# Patient Record
Sex: MALE | Race: WHITE | NOT HISPANIC OR LATINO | Employment: OTHER | URBAN - NONMETROPOLITAN AREA
[De-identification: names, ages, dates, MRNs, and addresses within clinical notes are randomized per-mention and may not be internally consistent; named-entity substitution may affect disease eponyms.]

---

## 2017-04-24 ENCOUNTER — OFFICE VISIT (OUTPATIENT)
Dept: SURGERY | Facility: CLINIC | Age: 62
End: 2017-04-24

## 2017-04-24 VITALS
DIASTOLIC BLOOD PRESSURE: 70 MMHG | SYSTOLIC BLOOD PRESSURE: 120 MMHG | BODY MASS INDEX: 25.9 KG/M2 | HEIGHT: 71 IN | TEMPERATURE: 98.7 F | WEIGHT: 185 LBS

## 2017-04-24 DIAGNOSIS — Z80.0 FAMILY HISTORY OF COLON CANCER: ICD-10-CM

## 2017-04-24 DIAGNOSIS — R13.14 PHARYNGOESOPHAGEAL DYSPHAGIA: ICD-10-CM

## 2017-04-24 DIAGNOSIS — Z12.11 SCREENING FOR COLON CANCER: ICD-10-CM

## 2017-04-24 DIAGNOSIS — D50.9 IRON DEFICIENCY ANEMIA, UNSPECIFIED IRON DEFICIENCY ANEMIA TYPE: Primary | ICD-10-CM

## 2017-04-24 PROCEDURE — 99244 OFF/OP CNSLTJ NEW/EST MOD 40: CPT | Performed by: SURGERY

## 2017-04-24 RX ORDER — MYCOPHENOLATE MOFETIL 250 MG/1
CAPSULE ORAL 2 TIMES DAILY
COMMUNITY

## 2017-04-24 RX ORDER — MULTIPLE VITAMINS W/ MINERALS TAB 9MG-400MCG
1 TAB ORAL DAILY
COMMUNITY

## 2017-04-24 RX ORDER — CYCLOSPORINE 25 MG/1
75 CAPSULE, LIQUID FILLED ORAL 2 TIMES DAILY
COMMUNITY

## 2017-04-24 RX ORDER — CLONIDINE HYDROCHLORIDE 0.2 MG/1
0.2 TABLET ORAL 2 TIMES DAILY
COMMUNITY

## 2017-04-24 RX ORDER — ASPIRIN 81 MG/1
81 TABLET ORAL DAILY
COMMUNITY

## 2017-04-24 RX ORDER — PRAVASTATIN SODIUM 20 MG
20 TABLET ORAL DAILY
COMMUNITY

## 2017-04-24 RX ORDER — DILTIAZEM HYDROCHLORIDE 180 MG/1
180 CAPSULE, COATED, EXTENDED RELEASE ORAL DAILY
COMMUNITY

## 2017-04-24 RX ORDER — SODIUM CHLORIDE 9 MG/ML
30 INJECTION, SOLUTION INTRAVENOUS CONTINUOUS PRN
Status: CANCELLED | OUTPATIENT
Start: 2017-04-24

## 2017-04-24 NOTE — PROGRESS NOTES
Primary Care Provider: Breanne Weaver MD    Reason for Consultation: Colonoscopy    Chief complaint: I'm here for a colonoscopy  Chief Complaint   Patient presents with   • Colon Polyps     Prior exam more than 3 yrs ago. Patient has posititve family hx of colon cancer. Patient denies rectal bleeding and constipation.        Subjective .     History of present illness:  I saw the patient in the office today as a consultation for evaluation for colonoscopy.  He has strong family history of colon cancer in his brother.  He has no black or bloody bowel movements.  No abdominal pain.  He also complains of intermittent dysphagia to solid foods.  No chest pain however.  No nausea or vomiting.  He also states that he is anemic.  Again no bleeding from his gastrointestinal system or genitourinary bleeding.    Review of Systems   Constitutional: Negative for chills, fever and unexpected weight change.   HENT: Negative for trouble swallowing and voice change.    Eyes: Negative for visual disturbance.   Respiratory: Negative for apnea, cough, chest tightness, shortness of breath and wheezing.    Cardiovascular: Negative for chest pain, palpitations and leg swelling.   Gastrointestinal: Negative for abdominal distention, abdominal pain, anal bleeding, blood in stool, constipation, diarrhea, nausea, rectal pain and vomiting.   Endocrine: Negative for cold intolerance and heat intolerance.   Genitourinary: Negative for difficulty urinating, dysuria, flank pain, scrotal swelling and testicular pain.   Musculoskeletal: Negative for back pain, gait problem and joint swelling.   Skin: Negative for color change, rash and wound.   Neurological: Negative for dizziness, syncope, speech difficulty, weakness, numbness and headaches.   Hematological: Negative for adenopathy. Does not bruise/bleed easily.   Psychiatric/Behavioral: Negative for confusion. The patient is not nervous/anxious.        History:  Past Medical History:   Diagnosis  Date   • Chronic kidney disease    • Diabetes mellitus    • DVT (deep venous thrombosis)    • Gout    • Hypertension    • Skin cancer        Past Surgical History:   Procedure Laterality Date   • CHOLECYSTECTOMY     • COLONOSCOPY     • TRANSPLANTATION RENAL         Family History   Problem Relation Age of Onset   • Cancer Father      bone   • Cancer Son      brother   • Hypertension Son        Social History   Substance Use Topics   • Smoking status: Former Smoker   • Smokeless tobacco: None   • Alcohol use No       Allergies:  No Known Allergies    Medications:    Current Outpatient Prescriptions:   •  aspirin 81 MG EC tablet, Take 81 mg by mouth Daily., Disp: , Rfl:   •  CloNIDine (CATAPRES) 0.2 MG tablet, Take 0.2 mg by mouth 2 (Two) Times a Day., Disp: , Rfl:   •  cycloSPORINE modified (NEORAL) 25 MG capsule, Take 25 mg by mouth 2 (Two) Times a Day., Disp: , Rfl:   •  diltiaZEM CD (CARDIZEM CD) 180 MG 24 hr capsule, Take 180 mg by mouth Daily., Disp: , Rfl:   •  Multiple Vitamins-Minerals (MULTIVITAMIN WITH MINERALS) tablet tablet, Take 1 tablet by mouth Daily., Disp: , Rfl:   •  mycophenolate (CELLCEPT) 250 MG capsule, Take  by mouth 2 (Two) Times a Day., Disp: , Rfl:   •  pravastatin (PRAVACHOL) 20 MG tablet, Take 20 mg by mouth Daily., Disp: , Rfl:     Objective     Vital Signs:   Temp:  [98.7 °F (37.1 °C)] 98.7 °F (37.1 °C)  BP: (120)/(70) 120/70    Physical Exam:   General Appearance:    Alert, cooperative, in no acute distress   Head:    Normocephalic, without obvious abnormality, atraumatic   Eyes:            Lids and lashes normal, conjunctivae and sclerae normal, no   icterus, no pallor, corneas clear, PERRL   Ears:    Ears appear intact with no abnormalities noted   Throat:   No oral lesions, no thrush, oral mucosa moist   Lungs:     Clear to auscultation,respirations regular, even and                  unlabored    Heart:    Regular rhythm and normal rate, normal S1 and S2, no            murmur    Abdomen:     no masses, no organomegaly, soft non-tender, non-distended, no guarding, there is evidence of right upper quadrant tenderness   Extremities:   Moves all extremities well, no edema, no cyanosis, no             redness   Skin:   No bleeding, bruising or rash   Neurologic:   Cranial nerves 2 - 12 grossly intact, sensation intact   Psychiatric: No evidence of depression or anxiety      Results Review:   I reviewed the patient's new clinical results. laboratory studies were reviewed    Assessment/Plan :    1. Iron deficiency anemia, unspecified iron deficiency anemia type    2. Pharyngoesophageal dysphagia    3. Screening for colon cancer    4. Family history of colon cancer        I recommend a colonoscopy for further evaluation. The procedure was explained as well as the risks which include but are not limited to bleeding, infection, perforation, abdominal pain etc. The patient understands these risks and the procedure and wishes to proceed.   I also recommended an EGD to further evaluate for his dysphagia and iron deficiency anemia.  He understands the additional risks of bleeding as well as esophageal injury specifically relating to possible esophageal dilatation.  He wishes to proceed.    El Poole MD  04/24/17

## 2017-04-28 ENCOUNTER — TELEPHONE (OUTPATIENT)
Dept: SURGERY | Facility: CLINIC | Age: 62
End: 2017-04-28

## 2017-04-28 NOTE — TELEPHONE ENCOUNTER
Pt requested his colonoscopy/egd to be rescheduled to June 30 at [facility]. Abby Bravo aware. Thanks.

## 2017-06-30 ENCOUNTER — HOSPITAL ENCOUNTER (OUTPATIENT)
Facility: HOSPITAL | Age: 62
Setting detail: HOSPITAL OUTPATIENT SURGERY
Discharge: HOME | End: 2017-06-30
Attending: SURGERY | Admitting: SURGERY

## 2017-06-30 ENCOUNTER — ANESTHESIA EVENT (OUTPATIENT)
Dept: GASTROENTEROLOGY | Facility: HOSPITAL | Age: 62
End: 2017-06-30

## 2017-06-30 ENCOUNTER — ANESTHESIA (OUTPATIENT)
Dept: GASTROENTEROLOGY | Facility: HOSPITAL | Age: 62
End: 2017-06-30

## 2017-06-30 VITALS
TEMPERATURE: 98.1 F | OXYGEN SATURATION: 95 % | DIASTOLIC BLOOD PRESSURE: 66 MMHG | HEART RATE: 56 BPM | RESPIRATION RATE: 18 BRPM | SYSTOLIC BLOOD PRESSURE: 108 MMHG | WEIGHT: 181 LBS | BODY MASS INDEX: 25.34 KG/M2 | HEIGHT: 71 IN

## 2017-06-30 DIAGNOSIS — R13.14 PHARYNGOESOPHAGEAL DYSPHAGIA: ICD-10-CM

## 2017-06-30 DIAGNOSIS — Z12.11 SCREENING FOR COLON CANCER: ICD-10-CM

## 2017-06-30 DIAGNOSIS — D50.9 IRON DEFICIENCY ANEMIA, UNSPECIFIED IRON DEFICIENCY ANEMIA TYPE: ICD-10-CM

## 2017-06-30 DIAGNOSIS — Z80.0 FAMILY HISTORY OF COLON CANCER: ICD-10-CM

## 2017-06-30 PROCEDURE — 25010000002 PROPOFOL 200 MG/20ML EMULSION: Performed by: NURSE ANESTHETIST, CERTIFIED REGISTERED

## 2017-06-30 PROCEDURE — C1726 CATH, BAL DIL, NON-VASCULAR: HCPCS | Performed by: SURGERY

## 2017-06-30 PROCEDURE — S0260 H&P FOR SURGERY: HCPCS | Performed by: SURGERY

## 2017-06-30 RX ORDER — ONDANSETRON 2 MG/ML
4 INJECTION INTRAMUSCULAR; INTRAVENOUS ONCE AS NEEDED
Status: DISCONTINUED | OUTPATIENT
Start: 2017-06-30 | End: 2017-06-30 | Stop reason: HOSPADM

## 2017-06-30 RX ORDER — PROPOFOL 10 MG/ML
INJECTION, EMULSION INTRAVENOUS AS NEEDED
Status: DISCONTINUED | OUTPATIENT
Start: 2017-06-30 | End: 2017-06-30 | Stop reason: SURG

## 2017-06-30 RX ORDER — SODIUM CHLORIDE 9 MG/ML
INJECTION, SOLUTION INTRAVENOUS CONTINUOUS PRN
Status: DISCONTINUED | OUTPATIENT
Start: 2017-06-30 | End: 2017-06-30 | Stop reason: SURG

## 2017-06-30 RX ORDER — SODIUM CHLORIDE 9 MG/ML
30 INJECTION, SOLUTION INTRAVENOUS CONTINUOUS PRN
Status: DISCONTINUED | OUTPATIENT
Start: 2017-06-30 | End: 2017-06-30 | Stop reason: HOSPADM

## 2017-06-30 RX ADMIN — SODIUM CHLORIDE: 9 INJECTION, SOLUTION INTRAVENOUS at 12:53

## 2017-06-30 RX ADMIN — SODIUM CHLORIDE 30 ML/HR: 9 INJECTION, SOLUTION INTRAVENOUS at 10:19

## 2017-06-30 RX ADMIN — PROPOFOL 500 MG: 10 INJECTION, EMULSION INTRAVENOUS at 12:53

## 2017-06-30 RX ADMIN — Medication 50 MG: at 12:53

## 2017-06-30 RX ADMIN — LIDOCAINE HYDROCHLORIDE 100 MG: 20 INJECTION, SOLUTION INTRAVENOUS at 12:53

## 2017-06-30 NOTE — ANESTHESIA PREPROCEDURE EVALUATION
Anesthesia Evaluation     Patient summary reviewed and Nursing notes reviewed   no history of anesthetic complications:  NPO Solid Status: > 8 hours  NPO Liquid Status: > 8 hours     Airway   Mallampati: I  TM distance: >3 FB  Neck ROM: full  no difficulty expected  Dental - normal exam     Pulmonary - normal exam   (+) a smoker Former,   Cardiovascular - normal exam    (+) hypertension, DVT,       Neuro/Psych- negative ROS  GI/Hepatic/Renal/Endo    (+)  renal disease CRI,     Musculoskeletal     Abdominal    Substance History - negative use     OB/GYN negative ob/gyn ROS         Other   (+) arthritis                                     Anesthesia Plan    ASA 3     MAC   (Risks and benefits discussed including risk of aspiration, recall and dental damage. All patient questions answered. Will continue with POC.)  intravenous induction   Anesthetic plan and risks discussed with patient.

## 2017-06-30 NOTE — ANESTHESIA POSTPROCEDURE EVALUATION
Patient: Jose Luis Bland    Procedure Summary     Date Anesthesia Start Anesthesia Stop Room / Location    06/30/17 [id number]       Procedure Diagnosis Surgeon Provider    COLONOSCOPY WITH COLD SNARE POLYPECTOMY X 2 (N/A Anus); ESOPHAGOGASTRODUODENOSCOPY WITH BIOPSIES; ESOPHAGEAL BALLOON DILATATION 18MM (N/A Esophagus) Family history of colon cancer; Pharyngoesophageal dysphagia; Screening for colon cancer; Iron deficiency anemia, unspecified iron deficiency anemia type  (Family history of colon cancer [Z80.0]; Pharyngoesophageal dysphagia [R13.14]; Screening for colon cancer [Z12.11]; Iron deficiency anemia, unspecified iron deficiency anemia type [D50.9]) MD Jose Santana CRNA          Anesthesia Type: MAC  Last vitals  BP      Temp      Pulse     Resp      SpO2        Post Anesthesia Care and Evaluation    Patient location during evaluation: PACU  Patient participation: complete - patient participated  Level of consciousness: awake and alert  Pain management: adequate  Airway patency: patent  Anesthetic complications: No anesthetic complications  PONV Status: none  Cardiovascular status: acceptable  Respiratory status: acceptable  Hydration status: acceptable

## 2017-07-07 LAB
LAB AP CASE REPORT: NORMAL
Lab: NORMAL
PATH REPORT.FINAL DX SPEC: NORMAL

## 2017-07-14 ENCOUNTER — OFFICE VISIT (OUTPATIENT)
Dept: SURGERY | Facility: CLINIC | Age: 62
End: 2017-07-14

## 2017-07-14 VITALS — HEART RATE: 64 BPM | TEMPERATURE: 98.4 F

## 2017-07-14 DIAGNOSIS — D50.9 IRON DEFICIENCY ANEMIA, UNSPECIFIED IRON DEFICIENCY ANEMIA TYPE: ICD-10-CM

## 2017-07-14 DIAGNOSIS — K57.30 DIVERTICULOSIS OF LARGE INTESTINE WITHOUT HEMORRHAGE: ICD-10-CM

## 2017-07-14 DIAGNOSIS — R13.14 PHARYNGOESOPHAGEAL DYSPHAGIA: Primary | ICD-10-CM

## 2017-07-14 PROCEDURE — 99213 OFFICE O/P EST LOW 20 MIN: CPT | Performed by: SURGERY

## 2017-07-14 NOTE — PROGRESS NOTES
Patient: Jose Luis Bland    YOB: 1955    Date: 07/14/2017    Primary Care Provider: Breanne Weaver MD    Reason for Consultation: Follow-up EGD/COLONOSCOPY    Chief Complaint:   Chief Complaint   Patient presents with   • Follow-up     egd/colonoscopy.       History of present illness:  I saw the patient in the office today as a followup from their recent EGD and colonoscopy.  Patient states that he is not having any issues.  Swallowing without difficulty and has had no further dysphagia.  He's had no black or bloody bowel movements.  No abdominal Pain    Review of Systems   Constitutional: Negative for chills, fever and unexpected weight change.   HENT: Negative for trouble swallowing and voice change.    Eyes: Negative for visual disturbance.   Respiratory: Negative for apnea, cough, chest tightness, shortness of breath and wheezing.    Cardiovascular: Negative for chest pain, palpitations and leg swelling.   Gastrointestinal: Negative for abdominal distention, abdominal pain, anal bleeding, blood in stool, constipation, diarrhea, nausea, rectal pain and vomiting.   Endocrine: Negative for cold intolerance and heat intolerance.   Genitourinary: Negative for difficulty urinating, dysuria, flank pain, scrotal swelling and testicular pain.   Musculoskeletal: Negative for back pain, gait problem and joint swelling.   Skin: Negative for color change, rash and wound.   Neurological: Negative for dizziness, syncope, speech difficulty, weakness, numbness and headaches.   Hematological: Negative for adenopathy. Does not bruise/bleed easily.   Psychiatric/Behavioral: Negative for confusion. The patient is not nervous/anxious.        Vital Signs:   Temp:  [98.4 °F (36.9 °C)] 98.4 °F (36.9 °C)  Heart Rate:  [64] 64    Allergies:  No Known Allergies    Medications:    Current Outpatient Prescriptions:   •  aspirin 81 MG EC tablet, Take 81 mg by mouth Daily., Disp: , Rfl:   •  CloNIDine (CATAPRES) 0.2 MG tablet,  Take 0.2 mg by mouth 2 (Two) Times a Day., Disp: , Rfl:   •  cycloSPORINE modified (NEORAL) 25 MG capsule, Take 75 mg by mouth 2 (Two) Times a Day. PATIENT REPORTS TAKES 3 OF THE 25MG TABLETS IN THE A.M. AND AGAIN IN THE P.M., Disp: , Rfl:   •  diltiaZEM CD (CARDIZEM CD) 180 MG 24 hr capsule, Take 180 mg by mouth Daily., Disp: , Rfl:   •  Multiple Vitamins-Minerals (MULTIVITAMIN WITH MINERALS) tablet tablet, Take 1 tablet by mouth Daily., Disp: , Rfl:   •  mycophenolate (CELLCEPT) 250 MG capsule, Take  by mouth 2 (Two) Times a Day., Disp: , Rfl:   •  pravastatin (PRAVACHOL) 20 MG tablet, Take 20 mg by mouth Daily., Disp: , Rfl:     Physical Exam:   General Appearance:    Alert, cooperative, in no acute distress   Abdomen:     no masses, no organomegaly, soft non-tender, non-distended, no guarding, wounds are well healed, no evidence of recurrent hernia            Cor:  Regular rate and rhythm      Assessment / Plan:    1. Pharyngoesophageal dysphagia :no issues at this time.  No evidence of eosinophilic esophagitis.  He is status post dilatation. follow-up as needed   2. Diverticulosis of large intestine without hemorrhage:fiber supplement of choice   3. Iron deficiency anemia, unspecified iron deficiency anemia type :improving.  No evidence of GI bleed(STOOL GUAIAC WAS NORMAL) and upper and lower endoscopy failed to reveal a bleeding source.  No further workup required unless he shows signs of GI bleeding.       I do recommend a repeat colonoscopy in 5 years for his adenomatous colon polyp.    Electronically signed by El Poole MD  07/14/17

## 2018-05-21 ENCOUNTER — OFFICE VISIT (OUTPATIENT)
Dept: SURGERY | Facility: CLINIC | Age: 63
End: 2018-05-21

## 2018-05-21 VITALS
SYSTOLIC BLOOD PRESSURE: 122 MMHG | HEART RATE: 60 BPM | DIASTOLIC BLOOD PRESSURE: 82 MMHG | OXYGEN SATURATION: 99 % | WEIGHT: 184 LBS | BODY MASS INDEX: 25.76 KG/M2 | HEIGHT: 71 IN | TEMPERATURE: 98.9 F

## 2018-05-21 DIAGNOSIS — R13.19 ESOPHAGEAL DYSPHAGIA: Primary | ICD-10-CM

## 2018-05-21 PROCEDURE — 99214 OFFICE O/P EST MOD 30 MIN: CPT | Performed by: SURGERY

## 2018-05-21 NOTE — PROGRESS NOTES
Patient: Jose Luis Bland    YOB: 1955    Date: 05/21/2018    Primary Care Provider: Breanne Weaver MD    Reason for Consultation: EGD    Chief Complaint   Patient presents with   • Difficulty Swallowing       Subjective .     History of present illness:  I saw the patient in the office today as a consultation for evaluation and treatment of dysphagia. Patient states that since yesterday's been having trouble swallowing solids.  Had to vomit up a bolus of food since it would not pass.  Finally today he was able to swallow liquids without difficulty.  He has no associated abdominal pain.  No lower GI complaints.  He had an EGD about a year ago with dilation which improved his symptoms.  He takes Zantac as needed for occasional reflux symptoms.    The following portions of the patient's history were reviewed and updated as appropriate: allergies, current medications, past family history, past medical history, past social history, past surgical history and problem list.      Review of Systems   Constitutional: Negative for chills, fever and unexpected weight change.   HENT: Positive for trouble swallowing. Negative for voice change.    Eyes: Negative for visual disturbance.   Respiratory: Negative for apnea, cough, chest tightness, shortness of breath and wheezing.    Cardiovascular: Negative for chest pain, palpitations and leg swelling.   Gastrointestinal: Negative for abdominal distention, abdominal pain, anal bleeding, blood in stool, constipation, diarrhea, nausea, rectal pain and vomiting.   Endocrine: Negative for cold intolerance and heat intolerance.   Genitourinary: Negative for difficulty urinating, dysuria, flank pain, scrotal swelling and testicular pain.   Musculoskeletal: Negative for back pain, gait problem and joint swelling.   Skin: Negative for color change, rash and wound.   Neurological: Negative for dizziness, syncope, speech difficulty, weakness, numbness and headaches.    Hematological: Negative for adenopathy. Does not bruise/bleed easily.   Psychiatric/Behavioral: Negative for confusion. The patient is not nervous/anxious.        History:  Past Medical History:   Diagnosis Date   • Arthritis     IN KNEES   • Chronic kidney disease    • DVT (deep venous thrombosis)     REPORTS SEVERAL YEARS AGO   • Dysphagia    • Gout    • History of cytomegalovirus infection 2001   • Hypertension    • Skin cancer    • Wears glasses        Past Surgical History:   Procedure Laterality Date   • CHOLECYSTECTOMY     • COLONOSCOPY     • COLONOSCOPY N/A 6/30/2017    Procedure: COLONOSCOPY WITH COLD SNARE POLYPECTOMY X 2;  Surgeon: El Poole MD;  Location: [facility] ENDOSCOPY;  Service:    • ENDOSCOPY N/A 6/30/2017    Procedure: ESOPHAGOGASTRODUODENOSCOPY WITH BIOPSIES; ESOPHAGEAL BALLOON DILATATION 18MM;  Surgeon: El Poole MD;  Location: [facility] ENDOSCOPY;  Service:    • ERCP W/ METAL STENT PLACEMENT      stent removed in Sept 2016   • OTHER SURGICAL HISTORY      LEFT ARM AFTER FRACTURE, REPORTS HARDWARE WAS PLACED   • SKIN BIOPSY     • TRANSPLANTATION RENAL         Family History   Problem Relation Age of Onset   • Cancer Father         bone   • Cancer Son         brother   • Hypertension Son        Social History   Substance Use Topics   • Smoking status: Former Smoker     Types: Cigarettes   • Smokeless tobacco: Never Used      Comment: QUIT 40 YEARS AGO, REPORTS SMOKED FOR 6-7 YEARS UP TO 1 PPD   • Alcohol use No       Medications:    Current Outpatient Prescriptions:   •  aspirin 81 MG EC tablet, Take 81 mg by mouth Daily., Disp: , Rfl:   •  CloNIDine (CATAPRES) 0.2 MG tablet, Take 0.2 mg by mouth 2 (Two) Times a Day., Disp: , Rfl:   •  cycloSPORINE modified (NEORAL) 25 MG capsule, Take 75 mg by mouth 2 (Two) Times a Day. PATIENT REPORTS TAKES 3 OF THE 25MG TABLETS IN THE A.M. AND AGAIN IN THE P.M., Disp: , Rfl:   •  diltiaZEM CD (CARDIZEM CD) 180 MG 24 hr capsule, Take 180 mg by  "mouth Daily., Disp: , Rfl:   •  Multiple Vitamins-Minerals (MULTIVITAMIN WITH MINERALS) tablet tablet, Take 1 tablet by mouth Daily., Disp: , Rfl:   •  mycophenolate (CELLCEPT) 250 MG capsule, Take  by mouth 2 (Two) Times a Day., Disp: , Rfl:   •  pravastatin (PRAVACHOL) 20 MG tablet, Take 20 mg by mouth Daily., Disp: , Rfl:      Allergies:  No Known Allergies    Objective     Vital Signs:   Vitals:    05/21/18 1327   BP: 122/82   Pulse: 60   Temp: 98.9 °F (37.2 °C)   SpO2: 99%   Weight: 83.5 kg (184 lb)   Height: 180.3 cm (70.98\")       Physical Exam:   General Appearance:    Alert, cooperative, in no acute distress   Head:    Normocephalic, without obvious abnormality, atraumatic   Eyes:            Lids and lashes normal, conjunctivae and sclerae normal, no   icterus, no pallor, corneas clear, PERRL   Lungs/respiratory:     Clear to auscultation without rhonchi or crackles     Heart/cardiovascular:    Regular rate and rhythm without obvious murmur.     Abdomen:     Soft nontender and nondistended without hepatosplenomegaly.     Extremities:   Moves all extremities well, no edema, no cyanosis, no             redness   Skin:   No bleeding, bruising or rash   Neurologic:   Cranial nerves 2 - 12 grossly intact, sensation intact   Psychiatric: No evidence of depression or anxiety      Results Review:   I reviewed the patient's new clinical results.    Review of Systems was reviewed and confirmed as accurate today.    Assessment/Plan :    1. Esophageal dysphagia        I recommend an upper endoscopy with possible esophageal dilation.  He understands this procedure as well as the risks of bleeding and perforation and he understands the ramifications of these potential complications and he wishes to proceed.  Patient may require Zantac on a daily basis given that he had some esophagitis findings on biopsy from the upper endoscopy although no visible esophagitis.    Electronically signed by El Poole, " MD  05/21/18  1:39 PM    Scribed for El Poole MD by Janina Blake. 5/21/2018  2:26 PM

## 2018-05-22 ENCOUNTER — ANESTHESIA (OUTPATIENT)
Dept: GASTROENTEROLOGY | Facility: HOSPITAL | Age: 63
End: 2018-05-22

## 2018-05-22 ENCOUNTER — ANESTHESIA EVENT (OUTPATIENT)
Dept: GASTROENTEROLOGY | Facility: HOSPITAL | Age: 63
End: 2018-05-22

## 2018-05-22 ENCOUNTER — HOSPITAL ENCOUNTER (OUTPATIENT)
Facility: HOSPITAL | Age: 63
Setting detail: HOSPITAL OUTPATIENT SURGERY
Discharge: HOME | End: 2018-05-22
Attending: SURGERY | Admitting: SURGERY

## 2018-05-22 VITALS
DIASTOLIC BLOOD PRESSURE: 68 MMHG | WEIGHT: 184 LBS | HEART RATE: 43 BPM | SYSTOLIC BLOOD PRESSURE: 111 MMHG | BODY MASS INDEX: 25.76 KG/M2 | OXYGEN SATURATION: 99 % | TEMPERATURE: 98.1 F | HEIGHT: 71 IN | RESPIRATION RATE: 18 BRPM

## 2018-05-22 DIAGNOSIS — R13.19 ESOPHAGEAL DYSPHAGIA: ICD-10-CM

## 2018-05-22 PROCEDURE — 25010000002 PROPOFOL 10 MG/ML EMULSION: Performed by: NURSE ANESTHETIST, CERTIFIED REGISTERED

## 2018-05-22 PROCEDURE — 25010000002 METOCLOPRAMIDE PER 10 MG: Performed by: NURSE ANESTHETIST, CERTIFIED REGISTERED

## 2018-05-22 PROCEDURE — C1726 CATH, BAL DIL, NON-VASCULAR: HCPCS | Performed by: SURGERY

## 2018-05-22 RX ORDER — LIDOCAINE HYDROCHLORIDE 20 MG/ML
INJECTION, SOLUTION EPIDURAL; INFILTRATION; INTRACAUDAL; PERINEURAL AS NEEDED
Status: DISCONTINUED | OUTPATIENT
Start: 2018-05-22 | End: 2018-05-22

## 2018-05-22 RX ORDER — MAGNESIUM HYDROXIDE 1200 MG/15ML
LIQUID ORAL AS NEEDED
Status: DISCONTINUED | OUTPATIENT
Start: 2018-05-22 | End: 2018-05-22 | Stop reason: HOSPADM

## 2018-05-22 RX ORDER — SODIUM CHLORIDE, SODIUM LACTATE, POTASSIUM CHLORIDE, CALCIUM CHLORIDE 600; 310; 30; 20 MG/100ML; MG/100ML; MG/100ML; MG/100ML
1000 INJECTION, SOLUTION INTRAVENOUS CONTINUOUS
Status: DISCONTINUED | OUTPATIENT
Start: 2018-05-22 | End: 2018-05-22

## 2018-05-22 RX ORDER — ONDANSETRON 2 MG/ML
4 INJECTION INTRAMUSCULAR; INTRAVENOUS ONCE AS NEEDED
Status: DISCONTINUED | OUTPATIENT
Start: 2018-05-22 | End: 2018-05-22 | Stop reason: HOSPADM

## 2018-05-22 RX ORDER — PROPOFOL 10 MG/ML
VIAL (ML) INTRAVENOUS AS NEEDED
Status: DISCONTINUED | OUTPATIENT
Start: 2018-05-22 | End: 2018-05-22 | Stop reason: SURG

## 2018-05-22 RX ORDER — ONDANSETRON 2 MG/ML
4 INJECTION INTRAMUSCULAR; INTRAVENOUS ONCE AS NEEDED
Status: CANCELLED | OUTPATIENT
Start: 2018-05-22 | End: 2018-05-22

## 2018-05-22 RX ORDER — LIDOCAINE HYDROCHLORIDE 20 MG/ML
INJECTION, SOLUTION EPIDURAL; INFILTRATION; INTRACAUDAL; PERINEURAL AS NEEDED
Status: DISCONTINUED | OUTPATIENT
Start: 2018-05-22 | End: 2018-05-22 | Stop reason: SURG

## 2018-05-22 RX ORDER — SODIUM CHLORIDE 9 MG/ML
500 INJECTION, SOLUTION INTRAVENOUS CONTINUOUS
Status: DISCONTINUED | OUTPATIENT
Start: 2018-05-22 | End: 2018-05-22 | Stop reason: HOSPADM

## 2018-05-22 RX ORDER — METOCLOPRAMIDE HYDROCHLORIDE 5 MG/ML
INJECTION INTRAMUSCULAR; INTRAVENOUS AS NEEDED
Status: DISCONTINUED | OUTPATIENT
Start: 2018-05-22 | End: 2018-05-22 | Stop reason: SURG

## 2018-05-22 RX ADMIN — LIDOCAINE HYDROCHLORIDE 100 MG: 20 INJECTION, SOLUTION EPIDURAL; INFILTRATION; INTRACAUDAL; PERINEURAL at 13:31

## 2018-05-22 RX ADMIN — SODIUM CHLORIDE 500 ML: 9 INJECTION, SOLUTION INTRAVENOUS at 12:29

## 2018-05-22 RX ADMIN — METOCLOPRAMIDE 10 MG: 5 INJECTION, SOLUTION INTRAMUSCULAR; INTRAVENOUS at 13:38

## 2018-05-22 RX ADMIN — PROPOFOL 200 MG: 10 INJECTION, EMULSION INTRAVENOUS at 13:50

## 2018-05-22 NOTE — H&P (VIEW-ONLY)
Patient: Jose Luis Bland    YOB: 1955    Date: 05/21/2018    Primary Care Provider: Breanne Weaver MD    Reason for Consultation: EGD    Chief Complaint   Patient presents with   • Difficulty Swallowing       Subjective .     History of present illness:  I saw the patient in the office today as a consultation for evaluation and treatment of dysphagia. Patient states that since yesterday's been having trouble swallowing solids.  Had to vomit up a bolus of food since it would not pass.  Finally today he was able to swallow liquids without difficulty.  He has no associated abdominal pain.  No lower GI complaints.  He had an EGD about a year ago with dilation which improved his symptoms.  He takes Zantac as needed for occasional reflux symptoms.    The following portions of the patient's history were reviewed and updated as appropriate: allergies, current medications, past family history, past medical history, past social history, past surgical history and problem list.      Review of Systems   Constitutional: Negative for chills, fever and unexpected weight change.   HENT: Positive for trouble swallowing. Negative for voice change.    Eyes: Negative for visual disturbance.   Respiratory: Negative for apnea, cough, chest tightness, shortness of breath and wheezing.    Cardiovascular: Negative for chest pain, palpitations and leg swelling.   Gastrointestinal: Negative for abdominal distention, abdominal pain, anal bleeding, blood in stool, constipation, diarrhea, nausea, rectal pain and vomiting.   Endocrine: Negative for cold intolerance and heat intolerance.   Genitourinary: Negative for difficulty urinating, dysuria, flank pain, scrotal swelling and testicular pain.   Musculoskeletal: Negative for back pain, gait problem and joint swelling.   Skin: Negative for color change, rash and wound.   Neurological: Negative for dizziness, syncope, speech difficulty, weakness, numbness and headaches.    Hematological: Negative for adenopathy. Does not bruise/bleed easily.   Psychiatric/Behavioral: Negative for confusion. The patient is not nervous/anxious.        History:  Past Medical History:   Diagnosis Date   • Arthritis     IN KNEES   • Chronic kidney disease    • DVT (deep venous thrombosis)     REPORTS SEVERAL YEARS AGO   • Dysphagia    • Gout    • History of cytomegalovirus infection 2001   • Hypertension    • Skin cancer    • Wears glasses        Past Surgical History:   Procedure Laterality Date   • CHOLECYSTECTOMY     • COLONOSCOPY     • COLONOSCOPY N/A 6/30/2017    Procedure: COLONOSCOPY WITH COLD SNARE POLYPECTOMY X 2;  Surgeon: El Poole MD;  Location: [facility] ENDOSCOPY;  Service:    • ENDOSCOPY N/A 6/30/2017    Procedure: ESOPHAGOGASTRODUODENOSCOPY WITH BIOPSIES; ESOPHAGEAL BALLOON DILATATION 18MM;  Surgeon: El Poole MD;  Location: [facility] ENDOSCOPY;  Service:    • ERCP W/ METAL STENT PLACEMENT      stent removed in Sept 2016   • OTHER SURGICAL HISTORY      LEFT ARM AFTER FRACTURE, REPORTS HARDWARE WAS PLACED   • SKIN BIOPSY     • TRANSPLANTATION RENAL         Family History   Problem Relation Age of Onset   • Cancer Father         bone   • Cancer Son         brother   • Hypertension Son        Social History   Substance Use Topics   • Smoking status: Former Smoker     Types: Cigarettes   • Smokeless tobacco: Never Used      Comment: QUIT 40 YEARS AGO, REPORTS SMOKED FOR 6-7 YEARS UP TO 1 PPD   • Alcohol use No       Medications:    Current Outpatient Prescriptions:   •  aspirin 81 MG EC tablet, Take 81 mg by mouth Daily., Disp: , Rfl:   •  CloNIDine (CATAPRES) 0.2 MG tablet, Take 0.2 mg by mouth 2 (Two) Times a Day., Disp: , Rfl:   •  cycloSPORINE modified (NEORAL) 25 MG capsule, Take 75 mg by mouth 2 (Two) Times a Day. PATIENT REPORTS TAKES 3 OF THE 25MG TABLETS IN THE A.M. AND AGAIN IN THE P.M., Disp: , Rfl:   •  diltiaZEM CD (CARDIZEM CD) 180 MG 24 hr capsule, Take 180 mg by  "mouth Daily., Disp: , Rfl:   •  Multiple Vitamins-Minerals (MULTIVITAMIN WITH MINERALS) tablet tablet, Take 1 tablet by mouth Daily., Disp: , Rfl:   •  mycophenolate (CELLCEPT) 250 MG capsule, Take  by mouth 2 (Two) Times a Day., Disp: , Rfl:   •  pravastatin (PRAVACHOL) 20 MG tablet, Take 20 mg by mouth Daily., Disp: , Rfl:      Allergies:  No Known Allergies    Objective     Vital Signs:   Vitals:    05/21/18 1327   BP: 122/82   Pulse: 60   Temp: 98.9 °F (37.2 °C)   SpO2: 99%   Weight: 83.5 kg (184 lb)   Height: 180.3 cm (70.98\")       Physical Exam:   General Appearance:    Alert, cooperative, in no acute distress   Head:    Normocephalic, without obvious abnormality, atraumatic   Eyes:            Lids and lashes normal, conjunctivae and sclerae normal, no   icterus, no pallor, corneas clear, PERRL   Lungs/respiratory:     Clear to auscultation without rhonchi or crackles     Heart/cardiovascular:    Regular rate and rhythm without obvious murmur.     Abdomen:     Soft nontender and nondistended without hepatosplenomegaly.     Extremities:   Moves all extremities well, no edema, no cyanosis, no             redness   Skin:   No bleeding, bruising or rash   Neurologic:   Cranial nerves 2 - 12 grossly intact, sensation intact   Psychiatric: No evidence of depression or anxiety      Results Review:   I reviewed the patient's new clinical results.    Review of Systems was reviewed and confirmed as accurate today.    Assessment/Plan :    1. Esophageal dysphagia        I recommend an upper endoscopy with possible esophageal dilation.  He understands this procedure as well as the risks of bleeding and perforation and he understands the ramifications of these potential complications and he wishes to proceed.  Patient may require Zantac on a daily basis given that he had some esophagitis findings on biopsy from the upper endoscopy although no visible esophagitis.    Electronically signed by El Poole, " MD  05/21/18  1:39 PM    Scribed for El Poole MD by Janina Blake. 5/21/2018  2:26 PM

## 2018-05-22 NOTE — ANESTHESIA POSTPROCEDURE EVALUATION
Patient: Jose Luis Bland    Procedure Summary     Date:  05/22/18 Room / Location:  [facility] ENDOSCOPY 3 / [facility] ENDOSCOPY    Anesthesia Start:  1327 Anesthesia Stop:  1356    Procedure:  ESOPHAGOGASTRODUODENOSCOPY WITH ESOPHAGEAL DILATION, WITH BIOPSY (N/A Esophagus) Diagnosis:       Esophageal dysphagia      (Esophageal dysphagia [R13.10])    Surgeon:  El Poole MD Provider:  Seven Nicole CRNA    Anesthesia Type:  MAC ASA Status:  3          Anesthesia Type: MAC  Last vitals  BP   111/68 (05/22/18 1427)   Temp   98.1 °F (36.7 °C) (05/22/18 1357)   Pulse   (!) 43 (05/22/18 1427)   Resp   18 (05/22/18 1427)     SpO2   99 % (05/22/18 1427)     Post Anesthesia Care and Evaluation    Patient location during evaluation: PHASE II  Patient participation: complete - patient participated  Level of consciousness: awake  Pain score: 1  Pain management: adequate  Airway patency: patent  Anesthetic complications: No anesthetic complications  PONV Status: controlled  Cardiovascular status: acceptable and stable  Respiratory status: acceptable  Hydration status: acceptable

## 2018-05-22 NOTE — ANESTHESIA PREPROCEDURE EVALUATION
Anesthesia Evaluation     Patient summary reviewed and Nursing notes reviewed   no history of anesthetic complications:  NPO Solid Status: > 8 hours  NPO Liquid Status: > 8 hours           Airway   Mallampati: II  TM distance: >3 FB  Neck ROM: full  no difficulty expected  Dental - normal exam     Pulmonary - normal exam   (+) a smoker Former,   Cardiovascular - normal exam    Rhythm: regular  Rate: normal    (+) hypertension well controlled, DVT resolved, hyperlipidemia,       Neuro/Psych- negative ROS  GI/Hepatic/Renal/Endo    (+)  GERD poorly controlled,  renal disease CRI,     Musculoskeletal     Abdominal    Substance History - negative use     OB/GYN negative ob/gyn ROS         Other   (+) arthritis   history of cancer remission    ROS/Med Hx Other: Hx of polycystic kidney disease. Had renal transplant 20 years ago.  Quit smoking over 40 yrs ago                  Anesthesia Plan    ASA 3     MAC   (Pt told that intravenous sedation will be used as the primary anesthetic along with local anesthesia if necessary. Every effort will be made to make sure the patient is comfortable.     The patient was told they may or may not have recall for the procedure. It was further explained that if the MAC was not adequate that a general anesthetic with either an LMA or endotracheal tube would be required.     Will proceed with the plan of care.)  intravenous induction   Anesthetic plan and risks discussed with patient.

## 2018-05-26 LAB
LAB AP CASE REPORT: NORMAL
Lab: NORMAL
PATH REPORT.FINAL DX SPEC: NORMAL

## 2018-06-04 ENCOUNTER — OFFICE VISIT (OUTPATIENT)
Dept: SURGERY | Facility: CLINIC | Age: 63
End: 2018-06-04

## 2018-06-04 VITALS
WEIGHT: 184.08 LBS | BODY MASS INDEX: 25.77 KG/M2 | SYSTOLIC BLOOD PRESSURE: 128 MMHG | HEART RATE: 98 BPM | DIASTOLIC BLOOD PRESSURE: 76 MMHG | OXYGEN SATURATION: 98 % | TEMPERATURE: 98.9 F | HEIGHT: 71 IN

## 2018-06-04 DIAGNOSIS — R13.19 ESOPHAGEAL DYSPHAGIA: Primary | ICD-10-CM

## 2018-06-04 PROCEDURE — 99213 OFFICE O/P EST LOW 20 MIN: CPT | Performed by: SURGERY

## 2018-06-04 NOTE — PROGRESS NOTES
"Patient: Jose Luis Bland    YOB: 1955    Date: 06/04/2018    Primary Care Provider: Breanne Weaver MD    Reason for Consultation: Follow-up EGD    Chief Complaint   Patient presents with   • Follow-up     follow up EGD       History of present illness:  I saw the patient in the office today as a followup from their recent EGD with biopsy, the pathology report did show nonspecific chronic duodenitis, mild chronic gastritis and features consistent with reflux esophagitis. Patient states that he is much improved.  Swallowing is back to normal.  No nausea or vomiting.  He denies any reflux symptoms.    The following portions of the patient's history were reviewed and updated as appropriate: allergies, current medications, past family history, past medical history, past social history, past surgical history and problem list.      Review of Systems   Constitutional: Negative for chills, fatigue and fever.   Respiratory: Negative for cough.    Cardiovascular: Negative for chest pain.   Gastrointestinal: Negative for abdominal pain, diarrhea, nausea and vomiting.       Vital Signs:   Vitals:    06/04/18 1447   BP: 128/76   Pulse: 98   Temp: 98.9 °F (37.2 °C)   SpO2: 98%   Weight: 83.5 kg (184 lb 1.4 oz)   Height: 180.3 cm (70.98\")       Allergies:  No Known Allergies    Medications:    Current Outpatient Prescriptions:   •  aspirin 81 MG EC tablet, Take 81 mg by mouth Daily., Disp: , Rfl:   •  CloNIDine (CATAPRES) 0.2 MG tablet, Take 0.2 mg by mouth 2 (Two) Times a Day., Disp: , Rfl:   •  cycloSPORINE modified (NEORAL) 25 MG capsule, Take 75 mg by mouth 2 (Two) Times a Day. PATIENT REPORTS TAKES 3 OF THE 25MG TABLETS IN THE A.M. AND AGAIN IN THE P.M., Disp: , Rfl:   •  diltiaZEM CD (CARDIZEM CD) 180 MG 24 hr capsule, Take 180 mg by mouth Daily., Disp: , Rfl:   •  Multiple Vitamins-Minerals (MULTIVITAMIN WITH MINERALS) tablet tablet, Take 1 tablet by mouth Daily., Disp: , Rfl:   •  mycophenolate (CELLCEPT) 250 " MG capsule, Take  by mouth 2 (Two) Times a Day., Disp: , Rfl:   •  pravastatin (PRAVACHOL) 20 MG tablet, Take 20 mg by mouth Daily., Disp: , Rfl:     Physical Exam:   General Appearance:    Alert, cooperative, in no acute distress   Abdomen:    Soft nondistended nontender.              Cor:  Regular rate and rhythm       Results Review:   I reviewed the patient's new clinical results.    Assessment / Plan:    1. Esophageal dysphagia        Patient with improving symptoms in fact is back to normal.  He has no reflux symptoms and I explained to him that we should at least consider taking daily Zantac since he does have some findings of reflux on biopsies although he is asymptomatic.  For now he'll hold off and I think that's reasonable.  Follow-up as needed.    Electronically signed by El Poole MD  06/04/18    Portions of this note has been scribed for El Poole MD by Janina Blake. 6/4/2018  3:11 PM

## 2021-02-25 ENCOUNTER — LAB (OUTPATIENT)
Dept: LAB | Facility: HOSPITAL | Age: 66
End: 2021-02-25

## 2021-02-25 DIAGNOSIS — Z23 IMMUNIZATION DUE: ICD-10-CM

## 2021-02-25 PROCEDURE — G0432 EIA HIV-1/HIV-2 SCREEN: HCPCS

## 2021-02-25 PROCEDURE — 36415 COLL VENOUS BLD VENIPUNCTURE: CPT

## 2021-02-25 PROCEDURE — 80074 ACUTE HEPATITIS PANEL: CPT

## 2021-12-27 LAB
HAV IGM SERPL QL IA: NORMAL
HBV CORE IGM SERPL QL IA: NORMAL
HBV SURFACE AG SERPL QL IA: NORMAL
HCV AB SER DONR QL: NORMAL
HIV1+2 AB SER QL: NORMAL

## 2022-07-15 ENCOUNTER — TELEPHONE (OUTPATIENT)
Dept: SURGERY | Facility: CLINIC | Age: 67
End: 2022-07-15

## 2022-07-19 NOTE — TELEPHONE ENCOUNTER
Attempted to contact patient to schedule for a colonoscopy, message left pending a call back at this time. I have mailed a letter to residence for patient.

## (undated) DEVICE — 2000CC GUARDIAN II: Brand: GUARDIAN

## (undated) DEVICE — SNAR POLYP SENSATION STDOVL 27 240 BX40

## (undated) DEVICE — FRCP BIOP COLD ENDOJAW ALLGTR W/NDL 2.8X2300MM BLU

## (undated) DEVICE — JELLY,LUBE,STERILE,FLIP TOP,TUBE,2-OZ: Brand: MEDLINE

## (undated) DEVICE — BALN DIL ESOPH CRE CONTRL RADL 15/18MM 8CM BX5

## (undated) DEVICE — CONMED SCOPE SAVER BITE BLOCK, 20X27 MM: Brand: SCOPE SAVER

## (undated) DEVICE — ENDOGATOR AUXILIARY WATER JET CONNECTOR: Brand: ENDOGATOR

## (undated) DEVICE — Device

## (undated) DEVICE — MEDI-VAC NON-CONDUCTIVE SUCTION TUBING: Brand: CARDINAL HEALTH

## (undated) DEVICE — TRAP,MUCUS SPECIMEN,40CC: Brand: MEDLINE

## (undated) DEVICE — SYR LUER SLPTP 50ML

## (undated) DEVICE — HIGH PERFORMANCE BALLOON DILATATION CATHETER: Brand: MAXFORCE TTS

## (undated) DEVICE — DEV INFL ALLIANCE2 SYS